# Patient Record
Sex: MALE
[De-identification: names, ages, dates, MRNs, and addresses within clinical notes are randomized per-mention and may not be internally consistent; named-entity substitution may affect disease eponyms.]

---

## 2017-03-22 ENCOUNTER — HOSPITAL ENCOUNTER (EMERGENCY)
Dept: HOSPITAL 31 - C.ER | Age: 52
Discharge: HOME | End: 2017-03-22
Payer: MEDICAID

## 2017-03-22 VITALS
OXYGEN SATURATION: 98 % | RESPIRATION RATE: 16 BRPM | SYSTOLIC BLOOD PRESSURE: 137 MMHG | HEART RATE: 85 BPM | DIASTOLIC BLOOD PRESSURE: 89 MMHG

## 2017-03-22 VITALS — TEMPERATURE: 98.6 F

## 2017-03-22 DIAGNOSIS — S30.0XXA: ICD-10-CM
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
## 2017-03-22 DIAGNOSIS — S39.012A: Primary | ICD-10-CM

## 2017-03-22 PROCEDURE — 72131 CT LUMBAR SPINE W/O DYE: CPT

## 2017-03-22 PROCEDURE — 99283 EMERGENCY DEPT VISIT LOW MDM: CPT

## 2017-03-22 PROCEDURE — 96372 THER/PROPH/DIAG INJ SC/IM: CPT
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
## 2017-03-23 NOTE — CT
PROCEDURE:  CT Lumbar Spine without contrast



HISTORY:

injury



COMPARISON:

None.



TECHNIQUE:

Axial computed tomography images were obtained of the lumbar spine 

without the use of intravenous contrast. Coronal and sagittal 

reformatted images were created and reviewed. 



Radiation dose:



Total exam DLP = 1088.34 mGy-cm.



FINDINGS:



VERTEBRAE:

No acute compression fractures no retropulsed fragments. The 

vertebral bodies exhibit normal stature aside from minor chronic 

appearing Schmorl's node formation seen along the anterior L4-L5 

endplates former more exuberant than latter. Vertebral bodies and 

facets normally aligned. 



DISCS/SPINAL CANAL/NEURAL FORAMINA:

Mild multilevel degenerative spondylosis. 



At the L4-L5 level, there is mild disc space narrowing more so along 

the posterior disc margin. Mild broad-based disc bulge ridge complex 

extends slightly into the proximal inferior margins of both exit 

foramina more so on the left.  The disc results in mild flattening of 

the ventral surface of the thecal sac.  Facets are slightly 

hypertrophic. The exit foramina are marginal to slightly narrowed. 



At the L3-L4 level, there is also minor posterior disc space 

narrowing.  Small broad-based disc bulge ridge complex noted which 

also results in mild flattening of the ventral surface of the thecal 

sac.  There is slight extension of disc bulging into the proximal 

inferior margins of both exit foramina more so on the left side. . 



L5-S1 level, there is mild disc space narrowing. Small broad-based 

disc bulge.  Mild facet arthropathy. Central bony canal and exit 

foramina appear adequate so far as can be seen. 



Minor posterior disc space narrowing seen at the remaining levels.  

There are no disc herniation or significant disc bulges.  The overall 

central bony canal exit foramina appear adequate. 



. 



PARASPINAL SOFT TISSUES:

Paraspinal soft tissues appear grossly unremarkable. 



OTHER FINDINGS:

None. 



IMPRESSION:

No acute fractures.  Mild multilevel degenerative spondylosis most 

notably affecting L4-L5 and to a lesser degree L3-L4 levels.

## 2017-05-22 ENCOUNTER — HOSPITAL ENCOUNTER (EMERGENCY)
Dept: HOSPITAL 14 - H.ER | Age: 52
Discharge: HOME | End: 2017-05-22
Payer: COMMERCIAL

## 2017-05-22 VITALS — BODY MASS INDEX: 23 KG/M2

## 2017-05-22 VITALS — RESPIRATION RATE: 20 BRPM | TEMPERATURE: 98.6 F

## 2017-05-22 VITALS — HEART RATE: 95 BPM | SYSTOLIC BLOOD PRESSURE: 132 MMHG | DIASTOLIC BLOOD PRESSURE: 95 MMHG | OXYGEN SATURATION: 100 %

## 2017-05-22 DIAGNOSIS — I10: ICD-10-CM

## 2017-05-22 DIAGNOSIS — M79.652: ICD-10-CM

## 2017-05-22 DIAGNOSIS — M25.552: Primary | ICD-10-CM

## 2017-05-22 NOTE — RAD
Indication: Pain no trauma 



Pelvis with left hip radiographs 



Comparison:  Left hip/pelvis radiographs performed 2/4/16 



Findings: 



Intramedullary selene and screw fixation of the left femur, partially 

imaged. No periprosthetic lucency to suggest infection or loosening.  

Visualized osseous structures appear intact. Soft tissues appear 

unremarkable. 



Impression: 



No acute findings.  If high clinical index of suspicion for occult 

fracture persists, cross-sectional imaging recommended for further 

evaluation.

## 2017-05-22 NOTE — ED PDOC
Lower Extremity Pain/Injury


Time Seen by Provider: 05/22/17 07:58


Chief Complaint (Nursing): Lower Extremity Problem/Injury


History Per: Patient


History/Exam Limitations: no limitations


Onset/Duration Of Symptoms: Gradual (approx 1 week)


Current Symptoms Are (Timing): Still Present


Severity: Mild


Legs Front+Back: 


  __________________________














  __________________________





 1 - pain states prev surgery no trauma





Additional History Per: Patient


Additional Complaint(s): 


 


pt c/o left hip pain radiating to left knee, denies fall, trauma or injury, pt 

is able to ambulate without assistance. Pt has hx of left hip surgery from 20+ 

yrs ago. pain worse with weight bearing better with rest. no foot n/t/w no cp 

or sob no leg swelling





Past Medical History


Reviewed: Historical Data, Nursing Documentation, Vital Signs


Vital Signs: 





 Last Vital Signs











Temp  98.6 F   05/22/17 07:39


 


Pulse  95 H  05/22/17 07:39


 


Resp  20   05/22/17 07:39


 


BP  132/95 H  05/22/17 07:39


 


Pulse Ox  100   05/22/17 07:39














- Medical History


PMH: HTN





- Family History


Family History: States: Unknown Family Hx





- Living Arrangements


Living Arrangements: With Family





- Social History


Current smoker - smoking cessation education provided: No


Alcohol: > 2 Drinks/Day





- Immunization History


Hx Tetanus Toxoid Vaccination: Yes (last booster within this past year as per 

patient)


Hx Influenza Vaccination: No


Hx Pneumococcal Vaccination: No





- Home Medications


Home Medications: 


 Ambulatory Orders











 Medication  Instructions  Recorded


 


Ibuprofen [Motrin] 600 mg PO Q6 #14 tab 03/22/17


 


Methocarbamol [Robaxin] 500 mg PO TID #14 tab 03/22/17


 


oxyCODONE/Acetaminophen [Percocet 1 tab PO TID #6 tab 03/22/17





5/325 mg Tab]  


 


Naproxen 500 mg PO BID PRN #3 ect 05/22/17














- Allergies


Allergies/Adverse Reactions: 


 Allergies











Allergy/AdvReac Type Severity Reaction Status Date / Time


 


No Known Allergies Allergy   Verified 05/22/17 07:39














Review of Systems


ROS Statement: Except As Marked, All Systems Reviewed And Found Negative


Constitutional: Negative for: Fever, Chills


Cardiovascular: Negative for: Chest Pain, Palpitations


Respiratory: Negative for: Shortness of Breath, SOB with Exertion


Gastrointestinal: Negative for: Nausea, Vomiting


Musculoskeletal: Positive for: Leg Pain.  Negative for: Back Pain


Neurological: Negative for: Weakness, Numbness, Incoordination





Physical Exam





- Reviewed


Nursing Documentation Reviewed: Yes


Vital Signs Reviewed: Yes





- Physical Exam


Appears: Positive for: Uncomfortable


Head Exam: Positive for: ATRAUMATIC, NORMAL INSPECTION, NORMOCEPHALIC


Skin: Positive for: Normal Color


Eye Exam: Positive for: Normal appearance


Neck: Positive for: Normal, Painless ROM, Supple


Cardiovascular/Chest: Positive for: Regular Rate, Rhythm, Chest Non Tender.  

Negative for: Edema, Gallop, Murmur, Bradycardia, Tachycardia


Respiratory: Positive for: Normal Breath Sounds.  Negative for: Decreased 

Breath Sounds, Accessory Muscle Use, Crackles, Wheezing


Pulses-Dorsalis Pedis (L): 2+


Pulses-Dorsalis Pedis (R): 2+


Pulses-Post. Tibialis (L): 2+


Pulses-Post. Tibialis (R): 2+


Back: Positive for: Normal Inspection.  Negative for: L CVA Tenderness, R CVA 

Tenderness, Vertebral Tenderness


Extremity: Positive for: Normal ROM, Other (well healed surgical scars at left 

thigh, foot nvi).  Negative for: Tenderness, Pedal Edema, Calf Tenderness, 

Deformity, Swelling


Neurologic/Psych: Positive for: Alert, CNs II-XII, Oriented, Mood/Affect (calm)

, Gait (steady).  Negative for: Motor/Sensory Deficits





- ECG


O2 Sat by Pulse Oximetry: 100


Pulse Ox Interpretation: Normal





- Progress


ED Course And Treament: 





pelvis, left hip and femurs 5 views shows prev hardware intact no fx or 

dislocation


Re-evaluation Time: 08:49


Condition: Improved





Disposition





- Clinical Impression


Clinical Impression: 


 Hip pain








- Patient ED Disposition


Is Patient to be Admitted: No


Counseled Patient/Family Regarding: Studies Performed, Diagnosis, Need For 

Followup





- Disposition


Referrals: 


[facility] [Outside] (2 to 3 days)


Disposition: Routine/Home


Disposition Time: 08:52


Condition: GOOD


Prescriptions: 


Naproxen 500 mg PO BID PRN #3 ect


 PRN Reason: Pain, Moderate (4-7)


Instructions:  Hip Pain (ED)

## 2017-05-22 NOTE — RAD
Indication: Pain no trauma 



Pelvis with left hip radiographs 



Comparison:  Left hip/pelvis radiographs performed 2/4/16 



Findings: 



Intramedullary selene and screw fixation of the femur, partially imaged. 

No periprosthetic lucency to suggest infection or loosening.  

Visualized osseous structures appear intact. Soft tissues appear 

unremarkable. 



Impression: 



No acute findings.  If high clinical index of suspicion for occult 

fracture persists, cross-sectional imaging recommended for further 

evaluation.

## 2017-05-22 NOTE — RAD
Impression: Pain, no trauma 



Left femur radiograph, single view 



Comparison: Left femur radiographs performed 2/4/16 



Findings: 



Limited single view. Intramedullary selene and screw fixation of a 

remote mid femoral shaft fracture. No acute displaced fracture 

identified. No obvious dislocation given absence of orthogonal views. 

No periprosthetic lucency identified to suggest loosening or 

infection. 



Impression: 



Intramedullary selene and screw fixation of remote mid left femur 

fracture. No acute findings.

## 2018-11-05 ENCOUNTER — HOSPITAL ENCOUNTER (EMERGENCY)
Dept: HOSPITAL 14 - H.ER | Age: 53
Discharge: HOME | End: 2018-11-05
Payer: MEDICAID

## 2018-11-05 VITALS — RESPIRATION RATE: 20 BRPM | DIASTOLIC BLOOD PRESSURE: 89 MMHG | SYSTOLIC BLOOD PRESSURE: 142 MMHG

## 2018-11-05 VITALS — BODY MASS INDEX: 23 KG/M2

## 2018-11-05 VITALS — HEART RATE: 79 BPM

## 2018-11-05 VITALS — TEMPERATURE: 97.9 F

## 2018-11-05 DIAGNOSIS — F10.129: Primary | ICD-10-CM
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
## 2018-11-05 DIAGNOSIS — Z91.14: ICD-10-CM

## 2018-11-05 DIAGNOSIS — F17.210: ICD-10-CM

## 2018-11-05 DIAGNOSIS — G40.409: ICD-10-CM

## 2018-11-05 DIAGNOSIS — I10: ICD-10-CM

## 2018-11-05 LAB
ALBUMIN SERPL-MCNC: 4.6 G/DL (ref 3.5–5)
ALBUMIN SERPL-MCNC: 5.1 G/DL (ref 3.5–5)
ALBUMIN/GLOB SERPL: 1.2 {RATIO} (ref 1–2.1)
ALBUMIN/GLOB SERPL: 1.3 {RATIO} (ref 1–2.1)
ALT SERPL-CCNC: 20 U/L (ref 21–72)
ALT SERPL-CCNC: 37 U/L (ref 21–72)
AST SERPL-CCNC: 29 U/L (ref 17–59)
AST SERPL-CCNC: 59 U/L (ref 17–59)
BASOPHILS # BLD AUTO: 0 K/UL (ref 0–0.2)
BASOPHILS # BLD AUTO: 0 K/UL (ref 0–0.2)
BASOPHILS NFR BLD: 0.4 % (ref 0–2)
BASOPHILS NFR BLD: 0.5 % (ref 0–2)
BUN SERPL-MCNC: 11 MG/DL (ref 9–20)
BUN SERPL-MCNC: 8 MG/DL (ref 9–20)
CALCIUM SERPL-MCNC: 8.7 MG/DL (ref 8.4–10.2)
CALCIUM SERPL-MCNC: 9 MG/DL (ref 8.4–10.2)
EOSINOPHIL # BLD AUTO: 0 K/UL (ref 0–0.7)
EOSINOPHIL # BLD AUTO: 0 K/UL (ref 0–0.7)
EOSINOPHIL NFR BLD: 0.4 % (ref 0–4)
EOSINOPHIL NFR BLD: 0.5 % (ref 0–4)
ERYTHROCYTE [DISTWIDTH] IN BLOOD BY AUTOMATED COUNT: 14.3 % (ref 11.5–14.5)
ERYTHROCYTE [DISTWIDTH] IN BLOOD BY AUTOMATED COUNT: 14.5 % (ref 11.5–14.5)
GFR NON-AFRICAN AMERICAN: 58
GFR NON-AFRICAN AMERICAN: > 60
HGB BLD-MCNC: 14.1 G/DL (ref 12–18)
HGB BLD-MCNC: 14.2 G/DL (ref 12–18)
LYMPHOCYTES # BLD AUTO: 1 K/UL (ref 1–4.3)
LYMPHOCYTES # BLD AUTO: 1.1 K/UL (ref 1–4.3)
LYMPHOCYTES NFR BLD AUTO: 17 % (ref 20–40)
LYMPHOCYTES NFR BLD AUTO: 20 % (ref 20–40)
MCH RBC QN AUTO: 29.1 PG (ref 27–31)
MCH RBC QN AUTO: 29.3 PG (ref 27–31)
MCHC RBC AUTO-ENTMCNC: 33.1 G/DL (ref 33–37)
MCHC RBC AUTO-ENTMCNC: 33.1 G/DL (ref 33–37)
MCV RBC AUTO: 88 FL (ref 80–94)
MCV RBC AUTO: 88.5 FL (ref 80–94)
MONOCYTES # BLD: 0.5 K/UL (ref 0–0.8)
MONOCYTES # BLD: 0.5 K/UL (ref 0–0.8)
MONOCYTES NFR BLD: 8.7 % (ref 0–10)
MONOCYTES NFR BLD: 9.2 % (ref 0–10)
NEUTROPHILS # BLD: 3.9 K/UL (ref 1.8–7)
NEUTROPHILS # BLD: 4.1 K/UL (ref 1.8–7)
NEUTROPHILS NFR BLD AUTO: 70.3 % (ref 50–75)
NEUTROPHILS NFR BLD AUTO: 73 % (ref 50–75)
NRBC BLD AUTO-RTO: 0 % (ref 0–0)
NRBC BLD AUTO-RTO: 0.1 % (ref 0–0)
PLATELET # BLD: 229 K/UL (ref 130–400)
PLATELET # BLD: 234 K/UL (ref 130–400)
PMV BLD AUTO: 7.2 FL (ref 7.2–11.7)
PMV BLD AUTO: 7.2 FL (ref 7.2–11.7)
RBC # BLD AUTO: 4.83 MIL/UL (ref 4.4–5.9)
RBC # BLD AUTO: 4.89 MIL/UL (ref 4.4–5.9)
WBC # BLD AUTO: 5.6 K/UL (ref 4.8–10.8)
WBC # BLD AUTO: 5.7 K/UL (ref 4.8–10.8)
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
## 2018-11-06 VITALS — OXYGEN SATURATION: 99 %

## 2018-11-06 NOTE — CARD
--------------- APPROVED REPORT --------------





Date of service: 11/05/2018



EKG Measurement

Heart [id number]

[id number]

[id number]

[id number]

[id number]



<Conclusion>

Normal sinus rhythm

Early repolarization

Normal ECG

## 2018-11-06 NOTE — CT
Date of service: 



11/05/2018



PROCEDURE:  CT HEAD WITHOUT CONTRAST.



HISTORY:

seizure



COMPARISON:

None available.



TECHNIQUE:

Axial computed tomography images were obtained through the head/brain 

without intravenous contrast.  



Radiation dose:



Total exam DLP = 765.72 mGy-cm.



This CT exam was performed using one or more of the following dose 

reduction techniques: Automated exposure control, adjustment of the 

mA and/or kV according to patient size, and/or use of iterative 

reconstruction technique.



FINDINGS:



HEMORRHAGE:

No intracranial hemorrhage. 



BRAIN:

Normal gray-white matter differentiation and density are appreciated 

throughout the cerebrum and cerebellum with the brainstem appearing 

unremarkable as well.  There is no mass effect.  There is no 

suspicious extra-axial fluid collection and the midline brain anatomy 

appears diffusely unremarkable. 



VENTRICLES:

Unremarkable. No hydrocephalus. 



CALVARIUM:

No destructive bony lesion or displaced fracture identified including 

through the skullbase.



PARANASAL SINUSES:

Unremarkable as visualized. No significant inflammatory changes.



MASTOID AIR CELLS:

Unremarkable as visualized. No inflammatory changes.



OTHER FINDINGS:

None.



IMPRESSION:

Stable unremarkable noncontrast head CT.



[facility] preliminary report from [facility], 11/05/2018.

## 2018-12-14 ENCOUNTER — HOSPITAL ENCOUNTER (EMERGENCY)
Dept: HOSPITAL 31 - C.ER | Age: 53
Discharge: HOME | End: 2018-12-14
Payer: MEDICAID

## 2018-12-14 VITALS — OXYGEN SATURATION: 99 % | DIASTOLIC BLOOD PRESSURE: 79 MMHG | HEART RATE: 81 BPM | SYSTOLIC BLOOD PRESSURE: 127 MMHG

## 2018-12-14 VITALS — BODY MASS INDEX: 23 KG/M2

## 2018-12-14 VITALS — RESPIRATION RATE: 16 BRPM | TEMPERATURE: 97.6 F

## 2018-12-14 DIAGNOSIS — F10.20: Primary | ICD-10-CM
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
## 2018-12-14 NOTE — C.PDOC
History Of Present Illness


53 year old male presents to the ED requesting detox for alcohol abuse. Patient 

admits to drinking alcohol today PTA, not currently intoxicated. Patient denies 

SI/HI, hallucinations, CP, SOB, injury, fall, trauma.


Time Seen by Provider: 12/14/18 04:49


Chief Complaint (Nursing): Substance Abuse


History Per: Patient


History/Exam Limitations: no limitations


Onset/Duration Of Symptoms: Hrs


Current Symptoms Are (Timing): Still Present


Suicide/Self Injury Attempted (Context): None


Modifying Factor(s): Alcohol


Associated Symptoms: denies: Depression, Suicidal Thoughts, Suicidal Plan


Recent travel outside of the [place]: No


Additional History Per: Patient





Past Medical History


Reviewed: Historical Data, Nursing Documentation, Vital Signs


Vital Signs: 





                                Last Vital Signs











Temp  97.6 F   12/14/18 04:32


 


Pulse  82   12/14/18 04:32


 


Resp  16   12/14/18 04:32


 


BP  171/109 H  12/14/18 04:32


 


Pulse Ox  98   12/14/18 04:32














- Medical History


PMH: HTN, Seizures


Surgical History: No Surg Hx


Family History: States: Unknown Family Hx





- Social History


Hx Alcohol Use: Yes


Hx Substance Use: No





- Immunization History


Hx Tetanus Toxoid Vaccination: Yes (last booster within this past year as per 

patient)


Hx Influenza Vaccination: No


Hx Pneumococcal Vaccination: No





Review Of Systems


Constitutional: Negative for: Fever, Chills


Cardiovascular: Negative for: Chest Pain


Respiratory: Negative for: Cough, Shortness of Breath


Gastrointestinal: Negative for: Nausea, Vomiting, Abdominal Pain


Musculoskeletal: Negative for: Neck Pain


Skin: Negative for: Rash


Neurological: Negative for: Weakness, Numbness


Psych: Negative for: Depression, Suicidal ideation





Physical Exam





- Physical Exam


Appears: Non-toxic, No Acute Distress


Skin: Normal Color, Warm, Dry, No Rash


Head: Atraumatic, Normacephalic


Eye(s): bilateral: Normal Inspection


Oral Mucosa: Moist


Neck: Normal ROM, Supple


Chest: Symmetrical


Cardiovascular: Rhythm Regular


Respiratory: Normal Breath Sounds, No Rales, No Rhonchi, No Wheezing


Gastrointestinal/Abdominal: Bowel Sounds (active), Soft, No Tenderness, No 

Guarding, No Rebound


Back: No CVA Tenderness


Extremity: Normal ROM, No Tenderness, No Swelling


Neurological/Psych: Oriented x3, Normal Speech, Normal Cognition, Normal Motor, 

Normal Sensation


Gait: Steady





ED Course And Treatment


O2 Sat by Pulse Oximetry: 98 (ON RA)


Pulse Ox Interpretation: Normal





Medical Decision Making


Medical Decision Making: 


There is no detox bed available at this time. Patient was placed on waitiing 

list and given referral by the crisis worker. 





Disposition





- Disposition


Referrals: 


[facility] [Outside]


Disposition: HOME/ ROUTINE


Disposition Time: 06:25


Condition: STABLE


Additional Instructions: 


Return if worsened. 


Instructions:  Alcohol Use - When Is Drinking a Problem?


Forms:  [employer] (English)





- Clinical Impression


Clinical Impression: 


 Alcohol dependence








- PA / NP / Resident Statement


MD/DO has reviewed & agrees with the documentation as recorded.





- Scribe Statement


The provider has reviewed the documentation as recorded by the Scribe


Quinton Baker





All medical record entries made by the Scribe were at my direction and 

personally dictated by me. I have reviewed the chart and agree that the record 

accurately reflects my personal performance of the history, physical exam, 

medical decision making, and the department course for this patient. I have also

 personally directed, reviewed, and agree with the discharge instructions and 

disposition.